# Patient Record
Sex: FEMALE | Race: WHITE
[De-identification: names, ages, dates, MRNs, and addresses within clinical notes are randomized per-mention and may not be internally consistent; named-entity substitution may affect disease eponyms.]

---

## 2014-11-04 VITALS
SYSTOLIC BLOOD PRESSURE: 129 MMHG | DIASTOLIC BLOOD PRESSURE: 76 MMHG | DIASTOLIC BLOOD PRESSURE: 76 MMHG | SYSTOLIC BLOOD PRESSURE: 129 MMHG | DIASTOLIC BLOOD PRESSURE: 76 MMHG | SYSTOLIC BLOOD PRESSURE: 129 MMHG | DIASTOLIC BLOOD PRESSURE: 76 MMHG | SYSTOLIC BLOOD PRESSURE: 129 MMHG | DIASTOLIC BLOOD PRESSURE: 76 MMHG | SYSTOLIC BLOOD PRESSURE: 129 MMHG | DIASTOLIC BLOOD PRESSURE: 76 MMHG | SYSTOLIC BLOOD PRESSURE: 129 MMHG

## 2017-05-04 ENCOUNTER — HOSPITAL ENCOUNTER (OUTPATIENT)
Dept: HOSPITAL 44 - LAB | Age: 62
End: 2017-05-04
Attending: INTERNAL MEDICINE
Payer: COMMERCIAL

## 2017-05-04 DIAGNOSIS — R74.8: ICD-10-CM

## 2017-05-04 DIAGNOSIS — K57.32: Primary | ICD-10-CM

## 2017-05-04 LAB
BASOPHILS NFR BLD: 0.5 % (ref 0–1.5)
EGFR (AFRICAN): > 60
EGFR (NON-AFRICAN): > 60
EOSINOPHIL NFR BLD: 5.8 % (ref 0–6.8)
MCH RBC QN AUTO: 31.6 PG (ref 28–34)
MCV RBC AUTO: 99.3 FL (ref 80–100)
MONOCYTES %: 5.6 % (ref 0–11)
NEUTROPHILS #: 4.8 # K/UL (ref 1.4–7.7)

## 2017-05-04 PROCEDURE — 85651 RBC SED RATE NONAUTOMATED: CPT

## 2017-05-04 PROCEDURE — 36415 COLL VENOUS BLD VENIPUNCTURE: CPT

## 2017-05-04 PROCEDURE — 82306 VITAMIN D 25 HYDROXY: CPT

## 2017-05-04 PROCEDURE — 86140 C-REACTIVE PROTEIN: CPT

## 2017-05-04 PROCEDURE — 80053 COMPREHEN METABOLIC PANEL: CPT

## 2017-05-04 PROCEDURE — 85025 COMPLETE CBC W/AUTO DIFF WBC: CPT

## 2017-11-02 ENCOUNTER — HOSPITAL ENCOUNTER (OUTPATIENT)
Dept: HOSPITAL 44 - LAB | Age: 62
End: 2017-11-02
Attending: INTERNAL MEDICINE
Payer: COMMERCIAL

## 2017-11-02 DIAGNOSIS — K51.00: ICD-10-CM

## 2017-11-02 DIAGNOSIS — K57.32: Primary | ICD-10-CM

## 2017-11-02 LAB
BASOPHILS NFR BLD: 0.5 % (ref 0–1.5)
EGFR (AFRICAN): > 60
EGFR (NON-AFRICAN): > 60
EOSINOPHIL NFR BLD: 5.3 % (ref 0–6.8)
MCH RBC QN AUTO: 32.6 PG (ref 28–34)
MCV RBC AUTO: 96.5 FL (ref 80–100)
MONOCYTES %: 6.1 % (ref 0–11)
NEUTROPHILS #: 3.8 # K/UL (ref 1.4–7.7)

## 2017-11-02 PROCEDURE — 86140 C-REACTIVE PROTEIN: CPT

## 2017-11-02 PROCEDURE — 82306 VITAMIN D 25 HYDROXY: CPT

## 2017-11-02 PROCEDURE — 80053 COMPREHEN METABOLIC PANEL: CPT

## 2017-11-02 PROCEDURE — 85651 RBC SED RATE NONAUTOMATED: CPT

## 2017-11-02 PROCEDURE — 36415 COLL VENOUS BLD VENIPUNCTURE: CPT

## 2017-11-02 PROCEDURE — 85025 COMPLETE CBC W/AUTO DIFF WBC: CPT

## 2018-05-14 ENCOUNTER — HOSPITAL ENCOUNTER (OUTPATIENT)
Dept: HOSPITAL 44 - LAB | Age: 63
End: 2018-05-14
Attending: INTERNAL MEDICINE
Payer: COMMERCIAL

## 2018-05-14 DIAGNOSIS — K51.00: ICD-10-CM

## 2018-05-14 DIAGNOSIS — R74.8: ICD-10-CM

## 2018-05-14 DIAGNOSIS — K57.32: Primary | ICD-10-CM

## 2018-05-14 LAB
BASOPHILS NFR BLD: 0.6 % (ref 0–1.5)
EOSINOPHIL NFR BLD: 9.3 % (ref 0–6.8)
MCH RBC QN AUTO: 31.1 PG (ref 28–34)
MCV RBC AUTO: 96.5 FL (ref 80–100)
MONOCYTES %: 4.6 % (ref 0–11)
NEUTROPHILS #: 4 # K/UL (ref 1.4–7.7)
PROT SERPL-MCNC: 6.7 G/DL (ref 6–8.5)

## 2018-05-14 PROCEDURE — 86140 C-REACTIVE PROTEIN: CPT

## 2018-05-14 PROCEDURE — 85025 COMPLETE CBC W/AUTO DIFF WBC: CPT

## 2018-05-14 PROCEDURE — 82306 VITAMIN D 25 HYDROXY: CPT

## 2018-05-14 PROCEDURE — 86706 HEP B SURFACE ANTIBODY: CPT

## 2018-05-14 PROCEDURE — 36415 COLL VENOUS BLD VENIPUNCTURE: CPT

## 2018-05-14 PROCEDURE — 85651 RBC SED RATE NONAUTOMATED: CPT

## 2018-05-14 PROCEDURE — 80053 COMPREHEN METABOLIC PANEL: CPT

## 2018-08-02 ENCOUNTER — HOSPITAL ENCOUNTER (OUTPATIENT)
Dept: HOSPITAL 44 - RAD | Age: 63
End: 2018-08-02
Attending: NURSE PRACTITIONER
Payer: COMMERCIAL

## 2018-08-02 DIAGNOSIS — M25.561: Primary | ICD-10-CM

## 2018-08-02 PROCEDURE — 73562 X-RAY EXAM OF KNEE 3: CPT

## 2018-08-02 NOTE — DIAGNOSTIC IMAGING REPORT
MARCOS NGO (NP) - OP 

[facility]

[address] [facility].[address] [address]

 

 

 

 

Report Submission Date: Aug 2, 2018 12:55:15 PM CDT

Patient       Study

Name:   SAMUEL BRADSHAW       Date:   Aug 2, 2018 12:29:22 PM CDT

MRN:   [id number]       Modality Type:   DX

Gender:   F       Description:   LOWER EXTREMITY

:   [birth year]       Institution:   [facility]

Physician:   MARCOS NGO) - OP

     Accession:    [id number]

 

 

Right knee 



History:  Pain 



AP, lateral and sunrise views of the right knee were obtained 



Chondrocalcinosis is present medially and laterally.  Osteophytes are present 
along the medial and lateral femoral condyles and medial and lateral tibial 
plateaus.  However, medial and lateral compartment joint space is maintained.  
There is minor narrowing of patellofemoral compartment joint space.  There is 
no joint effusion. 



Impression: 



Chondrocalcinosis of the medial and lateral joint compartments with associated 
osteophytosis consistent with CPPD, but there is no significant joint space 
narrowing. 



No acute osseous abnormality.

 

Electronically signed on Aug 2, 2018 12:55:15 PM CDT by:

Christelle FLOWERS

## 2018-12-04 ENCOUNTER — HOSPITAL ENCOUNTER (OUTPATIENT)
Dept: HOSPITAL 44 - LAB | Age: 63
End: 2018-12-04
Attending: INTERNAL MEDICINE
Payer: COMMERCIAL

## 2018-12-04 DIAGNOSIS — K51.00: Primary | ICD-10-CM

## 2018-12-04 DIAGNOSIS — R73.9: ICD-10-CM

## 2018-12-04 LAB
BASOPHILS NFR BLD: 0.5 % (ref 0–1.5)
EOSINOPHIL NFR BLD: 3.7 % (ref 0–6.8)
MCH RBC QN AUTO: 31.9 PG (ref 28–34)
MCV RBC AUTO: 96 FL (ref 80–100)
MONOCYTES %: 6.3 % (ref 0–11)
NEUTROPHILS #: 5.4 # K/UL (ref 1.4–7.7)

## 2018-12-04 PROCEDURE — 85025 COMPLETE CBC W/AUTO DIFF WBC: CPT

## 2018-12-04 PROCEDURE — 80053 COMPREHEN METABOLIC PANEL: CPT

## 2018-12-04 PROCEDURE — 83036 HEMOGLOBIN GLYCOSYLATED A1C: CPT

## 2018-12-04 PROCEDURE — 86140 C-REACTIVE PROTEIN: CPT

## 2018-12-04 PROCEDURE — 36415 COLL VENOUS BLD VENIPUNCTURE: CPT

## 2018-12-04 PROCEDURE — 85651 RBC SED RATE NONAUTOMATED: CPT

## 2018-12-04 PROCEDURE — 82652 VIT D 1 25-DIHYDROXY: CPT

## 2018-12-07 LAB — PROT SERPL-MCNC: 6.1 G/DL (ref 6–8.5)

## 2020-01-15 ENCOUNTER — HOSPITAL ENCOUNTER (OUTPATIENT)
Dept: HOSPITAL 44 - RT | Age: 65
End: 2020-01-15
Attending: CLINICAL NURSE SPECIALIST
Payer: COMMERCIAL

## 2020-01-15 DIAGNOSIS — E66.9: ICD-10-CM

## 2020-01-15 DIAGNOSIS — M17.0: Primary | ICD-10-CM

## 2020-01-15 DIAGNOSIS — E03.9: ICD-10-CM

## 2020-01-15 DIAGNOSIS — N95.9: ICD-10-CM

## 2020-01-15 DIAGNOSIS — I10: ICD-10-CM

## 2020-01-15 PROCEDURE — 81002 URINALYSIS NONAUTO W/O SCOPE: CPT

## 2020-01-15 PROCEDURE — 83735 ASSAY OF MAGNESIUM: CPT

## 2020-01-15 PROCEDURE — 82607 VITAMIN B-12: CPT

## 2020-01-15 PROCEDURE — 36415 COLL VENOUS BLD VENIPUNCTURE: CPT

## 2020-01-15 PROCEDURE — 84100 ASSAY OF PHOSPHORUS: CPT

## 2020-01-15 PROCEDURE — 84550 ASSAY OF BLOOD/URIC ACID: CPT

## 2020-01-15 PROCEDURE — 80053 COMPREHEN METABOLIC PANEL: CPT

## 2020-01-15 PROCEDURE — 84478 ASSAY OF TRIGLYCERIDES: CPT

## 2020-01-15 PROCEDURE — 83721 ASSAY OF BLOOD LIPOPROTEIN: CPT

## 2020-01-15 PROCEDURE — 84443 ASSAY THYROID STIM HORMONE: CPT

## 2020-01-15 PROCEDURE — 83718 ASSAY OF LIPOPROTEIN: CPT

## 2020-01-15 PROCEDURE — 82465 ASSAY BLD/SERUM CHOLESTEROL: CPT

## 2020-01-15 PROCEDURE — 84439 ASSAY OF FREE THYROXINE: CPT

## 2020-01-15 PROCEDURE — 82306 VITAMIN D 25 HYDROXY: CPT

## 2020-01-15 PROCEDURE — 85025 COMPLETE CBC W/AUTO DIFF WBC: CPT
